# Patient Record
Sex: FEMALE | Race: WHITE | Employment: FULL TIME | URBAN - METROPOLITAN AREA
[De-identification: names, ages, dates, MRNs, and addresses within clinical notes are randomized per-mention and may not be internally consistent; named-entity substitution may affect disease eponyms.]

---

## 2023-02-14 ENCOUNTER — OFFICE VISIT (OUTPATIENT)
Dept: OBGYN CLINIC | Age: 38
End: 2023-02-14

## 2023-02-14 VITALS
WEIGHT: 241.6 LBS | HEIGHT: 66 IN | BODY MASS INDEX: 38.83 KG/M2 | DIASTOLIC BLOOD PRESSURE: 62 MMHG | SYSTOLIC BLOOD PRESSURE: 113 MMHG

## 2023-02-14 DIAGNOSIS — Z01.419 ROUTINE GYNECOLOGICAL EXAMINATION: Primary | ICD-10-CM

## 2023-02-14 PROBLEM — I10 HYPERTENSIVE DISORDER: Status: ACTIVE | Noted: 2018-01-15

## 2023-02-14 PROBLEM — E55.9 VITAMIN D DEFICIENCY: Status: ACTIVE | Noted: 2021-05-09

## 2023-02-14 PROBLEM — F41.1 GENERALIZED ANXIETY DISORDER: Status: ACTIVE | Noted: 2021-05-09

## 2023-02-14 PROBLEM — E78.00 PURE HYPERCHOLESTEROLEMIA: Status: ACTIVE | Noted: 2023-02-13

## 2023-02-14 PROBLEM — E28.2 POLYCYSTIC OVARY SYNDROME: Status: ACTIVE | Noted: 2021-05-09

## 2023-02-14 PROCEDURE — 99385 PREV VISIT NEW AGE 18-39: CPT | Performed by: OBSTETRICS & GYNECOLOGY

## 2023-02-14 PROCEDURE — 3078F DIAST BP <80 MM HG: CPT | Performed by: OBSTETRICS & GYNECOLOGY

## 2023-02-14 PROCEDURE — 3074F SYST BP LT 130 MM HG: CPT | Performed by: OBSTETRICS & GYNECOLOGY

## 2023-02-14 RX ORDER — ESCITALOPRAM OXALATE 10 MG/1
10 TABLET ORAL DAILY
COMMUNITY
Start: 2023-01-12

## 2023-02-14 RX ORDER — DROSPIRENONE AND ETHINYL ESTRADIOL 0.02-3(28)
1 KIT ORAL DAILY
COMMUNITY
Start: 2023-01-12

## 2023-02-14 RX ORDER — METFORMIN HYDROCHLORIDE 500 MG/1
1000 TABLET, EXTENDED RELEASE ORAL 2 TIMES DAILY
COMMUNITY
Start: 2023-01-12

## 2023-02-14 RX ORDER — TOPIRAMATE 50 MG/1
50 TABLET, FILM COATED ORAL DAILY
COMMUNITY
Start: 2023-02-09

## 2023-02-14 RX ORDER — SPIRONOLACTONE 25 MG/1
25 TABLET ORAL DAILY
COMMUNITY
Start: 2023-01-12

## 2023-02-14 RX ORDER — ALPRAZOLAM 0.25 MG/1
TABLET ORAL
COMMUNITY

## 2023-02-14 RX ORDER — ALBUTEROL SULFATE 90 UG/1
AEROSOL, METERED RESPIRATORY (INHALATION)
COMMUNITY

## 2023-02-14 RX ORDER — CARVEDILOL 6.25 MG/1
TABLET ORAL
COMMUNITY

## 2023-02-14 RX ORDER — LOSARTAN POTASSIUM AND HYDROCHLOROTHIAZIDE 12.5; 5 MG/1; MG/1
1 TABLET ORAL DAILY
COMMUNITY
Start: 2023-01-12

## 2023-02-14 NOTE — PROGRESS NOTES
Marley Bran is a 44 y.o. female returns for an annual exam     Chief Complaint   Patient presents with    Well Woman       Patient's last menstrual period was 02/06/2023. Her periods are normal in flow and often irregular with no apparent pattern due to PCOS. She does not have dysmenorrhea. Problems: no significant problems  Birth Control: OCP (estrogen/progesterone). Agnieszka. Last Pap: normal obtained 5/26/2020. She does not have a history of MARIA D 2, 3 or cervical cancer. With regard to the Gardisil vaccine, she has not received it yet. 1. Have you been to the ER, urgent care clinic, or hospitalized since your last visit? No    2. Have you seen or consulted any other health care providers outside of the [address] [place] [address] since your last visit? PCP 1/2023 follow up.     Examination chaperoned by Micki Cooper MA

## 2023-02-14 NOTE — PROGRESS NOTES
ANNUAL EXAM AGES 18-39    Jerald Lara is a 44y.o. year old [de-identified]  UNAVAILABLE  WHITE/NON- female   Patient's last menstrual period was 2023. She presents for her annual checkup. Doing well from a Gyn perspective. Cycles are generally light for a day or two. No cramps. Had once cycle that lingered (light) x3 weeks. No vaginal discharge no yeast infections. Dx with hypercholesterolemia. Starting on topamax for weight gain. Problems: no significant problems  Birth Control: OCP (estrogen/progesterone). Agnieszka. Last Pap: normal obtained 2020. She does not have a history of MARIA D 2, 3 or cervical cancer. With regard to the Gardisil vaccine, she has not received it yet. Medical History:  Patient Active Problem List   Diagnosis Code    Generalized anxiety disorder F41.1    Hypertensive disorder I10    Polycystic ovary syndrome E28.2    Pure hypercholesterolemia E78.00    Vitamin D deficiency E55.9       Past Medical History:   Diagnosis Date    Heavy periods     Hypertension     White coat syndrome    Oligomenorrhea     PCOS (polycystic ovarian syndrome)      No past surgical history on file.   OB History    Para Term  AB Living   0 0 0 0 0 0   SAB IAB Ectopic Molar Multiple Live Births   0 0 0 0 0 0     Social History     Socioeconomic History    Marital status: SINGLE    Number of children: 0   Occupational History    Occupation: Nursing Director [employer]     Employer: [employer]     Comment: Quit    Tobacco Use    Smoking status: Never    Smokeless tobacco: Never   Vaping Use    Vaping Use: Never used   Substance and Sexual Activity    Alcohol use: Yes    Drug use: Never    Sexual activity: Yes     Partners: Male     Birth control/protection: Pill      Family History   Problem Relation Age of Onset    Hypertension Father      Current Outpatient Medications on File Prior to Visit   Medication Sig Dispense Refill    biotin/collagen/vit C/E/herbal (ALIVE HAIR, SKIN AND NAILS PO) Alive Hair, Skin and Nails      glucosam-chondroitin-diet cb25 116-100 mg cap glucosamine 116 mg-chondroitin 100 mg-dietary supplement #25 capsule   Take by oral route. albuterol (PROVENTIL HFA, VENTOLIN HFA, PROAIR HFA) 90 mcg/actuation inhaler albuterol sulfate HFA 90 mcg/actuation aerosol inhaler   TAKE 2 PUFFS BY MOUTH EVERY 4 HOURS AS NEEDED      ALPRAZolam (XANAX) 0.25 mg tablet alprazolam 0.25 mg tablet      carvediloL (COREG) 6.25 mg tablet carvedilol 6.25 mg tablet   Take 1 tablet twice a day by oral route for 90 days. escitalopram oxalate (LEXAPRO) 10 mg tablet Take 10 mg by mouth daily. Agnieszka, 28, 3-0.02 mg tab Take 1 Tablet by mouth daily. losartan-hydroCHLOROthiazide (HYZAAR) 50-12.5 mg per tablet Take 1 Tablet by mouth daily. metFORMIN ER (GLUCOPHAGE XR) 500 mg tablet Take 1,000 mg by mouth two (2) times a day. spironolactone (ALDACTONE) 25 mg tablet Take 25 mg by mouth daily. topiramate (TOPAMAX) 50 mg tablet Take 50 mg by mouth daily. No current facility-administered medications on file prior to visit.      Allergies   Allergen Reactions    Penicillins Rash and Hives       Review of Systems:  History obtained from the patient-negative for:  Constitutional: weight loss, fever, night sweats  HEENT: hearing loss, earache, congestion, snoring, sorethroat  CV: chest pain, palpitations, edema  Resp: cough, shortness of breath, wheezing  Breast: breast lumps, nipple discharge, galactorrhea  GI: change in bowel habits, abdominal pain, black or bloody stools  : frequency, dysuria, hematuria, vaginal discharge  MSK: back pain, joint pain, muscle pain  Skin: itching, rash, hives  Neuro: dizziness, headache, confusion, weakness  Psych: anxiety, depression, change in mood  Heme/lymph: bleeding, bruising, pallor    Physical Exam  Visit Vitals  /62   Ht 5' 6\" (1.676 m)   Wt 241 lb 9.6 oz (109.6 kg)   LMP 02/06/2023   BMI 39.00 kg/m² Constitutional  Appearance: well-nourished, well developed, alert, in no acute distress    HENT  Head and Face: appears normal    Neck  Inspection/Palpation: normal appearance, no masses or tenderness  Lymph Nodes: no lymphadenopathy present  Thyroid: gland size normal, nontender, no nodules or masses present on palpation    Chest  Respiratory Effort: breathing unlabored  Auscultation: normal breath sounds    Cardiovascular  Heart:   Auscultation: regular rate and rhythm without murmur    Breasts  Inspection of Breasts: breasts symmetrical, no skin changes, no discharge present, nipple appearance normal, no skin retraction present  Palpation of Breasts and Axillae: no masses present on palpation, no breast tenderness  Axillary Lymph Nodes: no lymphadenopathy present    Gastrointestinal  Abdominal Examination: abdomen non-tender to palpation, normal bowel sounds, no masses present  Liver and spleen: no hepatomegaly present, spleen not palpable  Hernias: no hernias identified    Genitourinary  External Genitalia: normal appearance for age, no discharge present, no tenderness present, no inflammatory lesions present, no masses present, no atrophy present  Vagina: normal vaginal vault without central or paravaginal defects, no discharge present, no inflammatory lesions present, no masses present  Bladder: non-tender to palpation  Urethra: appears normal  Cervix: normal   Uterus: normal size, shape and consistency  Adnexa: no adnexal tenderness present, no adnexal masses present  Perineum: perineum within normal limits, no evidence of trauma, no rashes or skin lesions present  Anus: anus within normal limits, no hemorrhoids present  Inguinal Lymph Nodes: no lymphadenopathy present    Skin  General Inspection: no rash, no lesions identified    Neurologic/Psychiatric  Mental Status:  Orientation: grossly oriented to person, place and time  Mood and Affect: mood normal, affect appropriate    Labs:  No results found for this or any previous visit (from the past 12 hour(s)). Assessment:    ICD-10-CM ICD-9-CM    1.  Routine gynecological examination  Z01.419 V72.31 PAP IG, APTIMA HPV AND RFX 16/18,45 ([id number])        Plan:  Counseled re: diet, exercise, healthy lifestyle  Rec screening mammo at 39  Return in about 1 year (around 2/14/2024) for Annual.